# Patient Record
Sex: MALE | Race: WHITE | Employment: FULL TIME | URBAN - NONMETROPOLITAN AREA
[De-identification: names, ages, dates, MRNs, and addresses within clinical notes are randomized per-mention and may not be internally consistent; named-entity substitution may affect disease eponyms.]

---

## 2021-12-17 ENCOUNTER — HOSPITAL ENCOUNTER (EMERGENCY)
Age: 51
Discharge: HOME | End: 2021-12-17
Attending: FAMILY MEDICINE
Payer: COMMERCIAL

## 2021-12-17 ENCOUNTER — APPOINTMENT (OUTPATIENT)
Dept: GENERAL RADIOLOGY | Age: 51
End: 2021-12-17
Payer: COMMERCIAL

## 2021-12-17 VITALS
TEMPERATURE: 98 F | RESPIRATION RATE: 16 BRPM | HEART RATE: 83 BPM | DIASTOLIC BLOOD PRESSURE: 75 MMHG | HEIGHT: 69 IN | BODY MASS INDEX: 29.62 KG/M2 | SYSTOLIC BLOOD PRESSURE: 114 MMHG | OXYGEN SATURATION: 92 % | WEIGHT: 200 LBS

## 2021-12-17 DIAGNOSIS — R09.02 HYPOXIA: ICD-10-CM

## 2021-12-17 DIAGNOSIS — U07.1 PNEUMONIA DUE TO COVID-19 VIRUS: Primary | ICD-10-CM

## 2021-12-17 DIAGNOSIS — J12.82 PNEUMONIA DUE TO COVID-19 VIRUS: Primary | ICD-10-CM

## 2021-12-17 LAB
ALBUMIN SERPL-MCNC: 3.1 GM/DL (ref 3.4–5)
ALP BLD-CCNC: 63 U/L (ref 46–116)
ALT SERPL-CCNC: 77 U/L (ref 14–63)
ANION GAP: 12 MEQ/L (ref 8–16)
AST SERPL-CCNC: 68 U/L (ref 15–37)
BASOPHILS # BLD: 0.6 % (ref 0–3)
BILIRUB SERPL-MCNC: 0.4 MG/DL (ref 0.2–1)
BUN BLDV-MCNC: 17 MG/DL (ref 7–18)
CHLORIDE BLD-SCNC: 103 MEQ/L (ref 98–107)
CO2: 26 MEQ/L (ref 21–32)
CREAT SERPL-MCNC: 1.3 MG/DL (ref 0.6–1.3)
EOSINOPHILS RELATIVE PERCENT: 0.7 % (ref 0–4)
GFR, ESTIMATED: 61 ML/MIN/1.73M2
GLUCOSE BLD-MCNC: 141 MG/DL (ref 74–106)
HCT VFR BLD CALC: 40.7 % (ref 42–52)
HEMOGLOBIN: 13.6 GM/DL (ref 14–18)
LYMPHOCYTES # BLD: 9.3 % (ref 15–47)
MCH RBC QN AUTO: 32.1 PG (ref 27–31)
MCHC RBC AUTO-ENTMCNC: 33.4 GM/DL (ref 33–37)
MCV RBC AUTO: 96.2 FL (ref 80–94)
MONOCYTES: 7.1 % (ref 0–12)
PDW BLD-RTO: 11.6 % (ref 11.5–14.5)
PLATELET # BLD: 175 THOU/MM3 (ref 130–400)
PMV BLD AUTO: 7.7 FL (ref 7.4–10.4)
POC CALCIUM: 8.5 MG/DL (ref 8.5–10.1)
POTASSIUM SERPL-SCNC: 3.8 MEQ/L (ref 3.5–5.1)
RBC # BLD: 4.23 MILL/MM3 (ref 4.7–6.1)
SEGS: 82.3 % (ref 43–75)
SODIUM BLD-SCNC: 141 MEQ/L (ref 136–145)
TOTAL PROTEIN: 7.2 GM/DL (ref 6.4–8.2)
WBC # BLD: 7.1 THOU/MM3 (ref 4.8–10.8)

## 2021-12-17 PROCEDURE — 85025 COMPLETE CBC W/AUTO DIFF WBC: CPT

## 2021-12-17 PROCEDURE — 71046 X-RAY EXAM CHEST 2 VIEWS: CPT

## 2021-12-17 PROCEDURE — 99284 EMERGENCY DEPT VISIT MOD MDM: CPT

## 2021-12-17 PROCEDURE — 80053 COMPREHEN METABOLIC PANEL: CPT

## 2021-12-17 PROCEDURE — 87636 SARSCOV2 & INF A&B AMP PRB: CPT

## 2021-12-17 RX ORDER — METHYLPREDNISOLONE 4 MG/1
TABLET ORAL
Qty: 1 KIT | Refills: 0 | Status: SHIPPED | OUTPATIENT
Start: 2021-12-17 | End: 2021-12-23

## 2021-12-17 ASSESSMENT — ENCOUNTER SYMPTOMS
DIARRHEA: 0
COUGH: 1
ABDOMINAL PAIN: 0
SHORTNESS OF BREATH: 1
BACK PAIN: 0
WHEEZING: 0
VOMITING: 0
SORE THROAT: 0
NAUSEA: 0

## 2021-12-17 NOTE — ED NOTES
Had patient out of bed and ambulate in place while in room. SpO2 90-92%. Once patient laid back down on cot SpO2 88-89% on room air and SOB after exertion. Dr Carol Maurice in room to witness event.        Cheri Sheikh RN  12/17/21 4206

## 2021-12-17 NOTE — Clinical Note
Jasmin oCx was seen and treated in our emergency department on 12/17/2021. He may return to work on 12/27/2021. If you have any questions or concerns, please don't hesitate to call.       Burton Corona MD

## 2021-12-17 NOTE — ED TRIAGE NOTES
Arrives to [facility] for the evaluation of SOB, cough, fever and fatigue. Symptoms started yesterday. Highest temp at home was reported to be 103. Was seen today by Amanda Jett NP. While in office tested negative for influenza and COVID. Patient was exposed to a person with COVID about 3 weeks ago. Patient and his wife quarantined but never was tested. States had mild symptoms then. Then yesterday symptoms started suddenly again. SpO2 was 89-90% on room air while at the PCP office. Provider called report. Is requested patient have a CXR to r/o pneumonia which could be post COVID. Afebrile at this time. Denies body aches. VSS. Call light in reach. Wife at bedside.

## 2021-12-17 NOTE — ED PROVIDER NOTES
[address]/[facility] COMPLAINT       Chief Complaint   Patient presents with    Shortness of Breath     Low SpO2    Cough    Fever    Fatigue       Nurses Notes reviewed and I agree except as noted in the HPI. HISTORY OF PRESENT ILLNESS    Balwinder Orr is a 45 y.o. male who presents for evaluation of cough, shortness of breath, fever, chills, and fatigue. He states that his symptoms started yesterday. He reports a temperature of 103 °F as his T-max. He was seen by his PCP today where he tested negative for COVID-19 infection and influenza. States that he was exposed to Covid about 3 weeks ago and has already quarantined. He has been taking Tylenol, Motrin, and an over-the-counter multisymptom flu medication. REVIEW OF SYSTEMS     Review of Systems   Constitutional: Positive for chills, fatigue and fever. Negative for activity change and appetite change. HENT: Positive for congestion. Negative for ear pain and sore throat. Respiratory: Positive for cough and shortness of breath. Negative for wheezing. Cardiovascular: Negative for chest pain and leg swelling. Gastrointestinal: Negative for abdominal pain, diarrhea, nausea and vomiting. Genitourinary: Negative for dysuria, flank pain and hematuria. Musculoskeletal: Negative for arthralgias, back pain, gait problem and neck pain. Skin: Negative for rash and wound. Neurological: Negative for weakness, light-headedness and headaches. Psychiatric/Behavioral: Negative for agitation and hallucinations. The patient is not nervous/anxious. PAST MEDICAL HISTORY    has no past medical history on file. SURGICAL HISTORY      has no past surgical history on file. CURRENT MEDICATIONS       Previous Medications    No medications on file       ALLERGIES     has No Known Allergies. FAMILY HISTORY     has no family status information on file.     family history is not on file.    SOCIAL HISTORY      reports that he has quit smoking. He has never used smokeless tobacco. He reports current alcohol use of about 2.0 standard drinks of alcohol per week. He reports previous drug use. PHYSICAL EXAM     INITIAL VITALS:  height is 5' 9\" (1.753 m) and weight is 200 lb (90.7 kg). His oral temperature is 98 °F (36.7 °C). His blood pressure is 132/72 and his pulse is 84. His respiration is 18 and oxygen saturation is 94%. Physical Exam  Vitals and nursing note reviewed. Constitutional:       General: He is not in acute distress. Appearance: He is not diaphoretic. HENT:      Head: Normocephalic and atraumatic. Cardiovascular:      Rate and Rhythm: Normal rate and regular rhythm. Heart sounds: Normal heart sounds. Pulmonary:      Effort: Pulmonary effort is normal.      Breath sounds: Examination of the right-lower field reveals decreased breath sounds and rales. Examination of the left-lower field reveals decreased breath sounds and rales. Decreased breath sounds and rales present. Abdominal:      General: Bowel sounds are normal. There is no distension. Palpations: Abdomen is soft. Tenderness: There is no abdominal tenderness. Lymphadenopathy:      Cervical: No cervical adenopathy. Skin:     General: Skin is warm and dry. Neurological:      Mental Status: He is alert. DIFFERENTIAL DIAGNOSIS:   Pneumonia, COVID-19 infection, viral URI, bronchitis    DIAGNOSTIC RESULTS         RADIOLOGY: non-plain filmimages(s) such as CT, Ultrasound and MRI are read by the radiologist.  XR CHEST (2 VW)   Final Result   1. Extensive bilateral pulmonary opacification consistent with inflammatory process possibly Covid 19 infection. 2. Mild cardiomegaly. 3. Otherwise negative chest x-ray. .               **This report has been created using voice recognition software. It may contain minor errors which are inherent in voice recognition technology. **      Final report electronically signed by DR Igor Johnson on 12/17/2021 3:14 PM            LABS:   Labs Reviewed   COVID-19       DEPARTMENT COURSE:   Vitals:    Vitals:    12/17/21 1410   BP: 132/72   Pulse: 84   Resp: 18   Temp: 98 °F (36.7 °C)   TempSrc: Oral   SpO2: 94%   Weight: 200 lb (90.7 kg)   Height: 5' 9\" (1.753 m)       MDM:  Patient has had close exposure to COVID-19 at the beginning of the month and has developed worsening symptoms including shortness of breath and oxygen saturation noted to be 88% after ambulating is found to have viral pneumonia changes on chest x-ray imaging. Secondary to his hypoxia is provided with a pulse oximeter provided with home oxygen. PCR Covid testing is performed since he had a negative test in his PCPs office. He is instructed to return for any symptom worsening or for evaluation of new concerning symptoms. He was asked instructed to return if oxygen saturation remains below 80% even though he is supplementing it. Patient and his wife understood and were agreeable with above-stated plans. On the morning of 1218/2021 PCR test reveals patient does have COVID-19 infection. He was informed of this diagnosis and recommended to quarantine for 10 days. He was discharged home with oxygen supplementation and will follow up with his PCP regarding use. Will return for symptom worsening or for evaluation of new concerning symptoms. CRITICAL CARE:   None    CONSULTS:  None    PROCEDURES:  None    FINAL IMPRESSION      1. Pneumonia due to COVID-19 virus    2.  Hypoxia          DISPOSITION/PLAN   Discharge    PATIENT REFERRED TO:  Reina Drew, [address]   [phone]    Schedule an appointment as soon as possible for a visit in 10 days  As needed    [address]  [facility] [address]  [address] [address] [phone]  Go to   If symptoms worsen      DISCHARGEMEDICATIONS:  New Prescriptions    No medications on file (Please note that portions of this note were completedwith a voice recognition program.  Efforts were made to edit the dictations but occasionally words are mis-transcribed.)    MD Renuka Bay MD  12/18/21 0025       Renuka Bunch MD  12/18/21 5389

## 2021-12-18 LAB
INFLUENZA A: NOT DETECTED
INFLUENZA B: NOT DETECTED
SARS-COV-2 RNA, RT PCR: DETECTED

## 2021-12-18 NOTE — ED NOTES
Called and spoke with patient's wife. Updated of the +COVID 19 result. Wife downloaded the [employer] and was aware of the result. Plan to continue treatment initiated on 12/17/21. Instructed to quarantine until 12/27/21 and to notify the [facility] if needs a work excuse.       Cynthia Benton RN  12/18/21 3040

## 2021-12-20 ENCOUNTER — CARE COORDINATION (OUTPATIENT)
Dept: CARE COORDINATION | Age: 51
End: 2021-12-20

## 2021-12-20 NOTE — CARE COORDINATION
Patient contacted regarding COVID-19 diagnosis and pulse oximeter ordered at discharge. Discussed COVID-19 related testing which was available at this time. Test results were positive. Patient informed of results, if available? Yes. Ambulatory Care Manager contacted the patient by telephone to perform post discharge assessment. Call within 2 business days of discharge: Yes. Verified name and  with patient as identifiers. Provided introduction to self, and explanation of the CTN/ACM role, and reason for call due to risk factors for infection and/or exposure to COVID-19. Symptoms reviewed with patient who verbalized the following symptoms: cough, shortness of breath, no new symptoms and no worsening symptoms. Due to no new or worsening symptoms encounter was not routed to provider for escalation. Discussed follow-up appointments. If no appointment was previously scheduled, appointment scheduling offered: No.  [facility] follow up appointment(s): No future appointments. Non-[facility] follow up appointment(s): had appt with Dr. Emma Yoo    Non-face-to-face services provided:  Obtained and reviewed discharge summary and/or continuity of care documents     Advance Care Planning:   Does patient have an Advance Directive:  not on file. Educated patient about risk for severe COVID-19 due to risk factors according to CDC guidelines. ACM reviewed discharge instructions, medical action plan and red flag symptoms with the spouse who verbalized understanding. Discussed COVID vaccination status: No. Education provided on COVID-19 vaccination as appropriate. Discussed exposure protocols and quarantine with CDC Guidelines. spouse was given an opportunity to verbalize any questions and concerns and agrees to contact ACM or health care provider for questions related to their healthcare.     Reviewed and educated spouse on any new and changed medications related to discharge diagnosis     Was patient discharged with a pulse oximeter? Yes 90-92% on 2 liters oxygen Discussed and confirmed pulse oximeter discharge instructions and when to notify provider or seek emergency care. ACM provided contact information. Plan for follow-up call in 3-5 days based on severity of symptoms and risk factors. Reviewed COVID zones.

## 2021-12-22 ENCOUNTER — CARE COORDINATION (OUTPATIENT)
Dept: CARE COORDINATION | Age: 51
End: 2021-12-22

## 2021-12-22 NOTE — CARE COORDINATION
You Patient resolved from the Care Transitions episode on 12/22/21  Discussed COVID-19 related testing which was available at this time. Test results were positive. Patient informed of results, if available? n/a    Patient/family has been provided the following resources and education related to COVID-19:                         Signs, symptoms and red flags related to COVID-19            CDC exposure and quarantine guidelines            [facility] exposure contact - [phone]            Contact for their local Department of Health                 Patient currently reports that the following symptoms have improved:  no new/worsening symptoms . Cough/SOB present but improving slightly. On oxygen at 2 liters. SPO2 improving. 95-96% on oxygen. No further outreach scheduled with this CTN/ACM. Episode of Care resolved. Patient has this CTN/ACM contact information if future needs arise. Wife will be contacting Dr. Awa Arndt today to provide update and arrange another f/u appt.